# Patient Record
Sex: FEMALE | Race: BLACK OR AFRICAN AMERICAN | HISPANIC OR LATINO | URBAN - METROPOLITAN AREA
[De-identification: names, ages, dates, MRNs, and addresses within clinical notes are randomized per-mention and may not be internally consistent; named-entity substitution may affect disease eponyms.]

---

## 2017-10-25 ENCOUNTER — OUTPATIENT (OUTPATIENT)
Dept: OUTPATIENT SERVICES | Facility: HOSPITAL | Age: 72
LOS: 1 days | End: 2017-10-25
Payer: MEDICARE

## 2017-10-25 VITALS
RESPIRATION RATE: 16 BRPM | WEIGHT: 177.91 LBS | OXYGEN SATURATION: 97 % | DIASTOLIC BLOOD PRESSURE: 90 MMHG | SYSTOLIC BLOOD PRESSURE: 160 MMHG | HEART RATE: 59 BPM | HEIGHT: 67 IN | TEMPERATURE: 98 F

## 2017-10-25 DIAGNOSIS — Z90.49 ACQUIRED ABSENCE OF OTHER SPECIFIED PARTS OF DIGESTIVE TRACT: Chronic | ICD-10-CM

## 2017-10-25 DIAGNOSIS — M17.12 UNILATERAL PRIMARY OSTEOARTHRITIS, LEFT KNEE: ICD-10-CM

## 2017-10-25 DIAGNOSIS — Z01.818 ENCOUNTER FOR OTHER PREPROCEDURAL EXAMINATION: ICD-10-CM

## 2017-10-25 LAB
ALBUMIN SERPL ELPH-MCNC: 3.7 G/DL — SIGNIFICANT CHANGE UP (ref 3.5–5)
ALP SERPL-CCNC: 93 U/L — SIGNIFICANT CHANGE UP (ref 40–120)
ALT FLD-CCNC: 18 U/L DA — SIGNIFICANT CHANGE UP (ref 10–60)
ANION GAP SERPL CALC-SCNC: 3 MMOL/L — LOW (ref 5–17)
APTT BLD: 30.9 SEC — SIGNIFICANT CHANGE UP (ref 27.5–37.4)
AST SERPL-CCNC: 17 U/L — SIGNIFICANT CHANGE UP (ref 10–40)
BILIRUB SERPL-MCNC: 0.8 MG/DL — SIGNIFICANT CHANGE UP (ref 0.2–1.2)
BUN SERPL-MCNC: 12 MG/DL — SIGNIFICANT CHANGE UP (ref 7–18)
CALCIUM SERPL-MCNC: 9.2 MG/DL — SIGNIFICANT CHANGE UP (ref 8.4–10.5)
CHLORIDE SERPL-SCNC: 106 MMOL/L — SIGNIFICANT CHANGE UP (ref 96–108)
CO2 SERPL-SCNC: 32 MMOL/L — HIGH (ref 22–31)
CREAT SERPL-MCNC: 0.65 MG/DL — SIGNIFICANT CHANGE UP (ref 0.5–1.3)
GLUCOSE SERPL-MCNC: 103 MG/DL — HIGH (ref 70–99)
HCT VFR BLD CALC: 41 % — SIGNIFICANT CHANGE UP (ref 34.5–45)
HGB BLD-MCNC: 13.8 G/DL — SIGNIFICANT CHANGE UP (ref 11.5–15.5)
INR BLD: 0.97 RATIO — SIGNIFICANT CHANGE UP (ref 0.88–1.16)
MCHC RBC-ENTMCNC: 30.6 PG — SIGNIFICANT CHANGE UP (ref 27–34)
MCHC RBC-ENTMCNC: 33.6 GM/DL — SIGNIFICANT CHANGE UP (ref 32–36)
MCV RBC AUTO: 91.1 FL — SIGNIFICANT CHANGE UP (ref 80–100)
PLATELET # BLD AUTO: 266 K/UL — SIGNIFICANT CHANGE UP (ref 150–400)
POTASSIUM SERPL-MCNC: 4 MMOL/L — SIGNIFICANT CHANGE UP (ref 3.5–5.3)
POTASSIUM SERPL-SCNC: 4 MMOL/L — SIGNIFICANT CHANGE UP (ref 3.5–5.3)
PROT SERPL-MCNC: 7.5 G/DL — SIGNIFICANT CHANGE UP (ref 6–8.3)
PROTHROM AB SERPL-ACNC: 10.6 SEC — SIGNIFICANT CHANGE UP (ref 9.8–12.7)
RBC # BLD: 4.5 M/UL — SIGNIFICANT CHANGE UP (ref 3.8–5.2)
RBC # FLD: 11.9 % — SIGNIFICANT CHANGE UP (ref 10.3–14.5)
SODIUM SERPL-SCNC: 141 MMOL/L — SIGNIFICANT CHANGE UP (ref 135–145)
WBC # BLD: 4 K/UL — SIGNIFICANT CHANGE UP (ref 3.8–10.5)
WBC # FLD AUTO: 4 K/UL — SIGNIFICANT CHANGE UP (ref 3.8–10.5)

## 2017-10-25 PROCEDURE — 87640 STAPH A DNA AMP PROBE: CPT

## 2017-10-25 PROCEDURE — 87641 MR-STAPH DNA AMP PROBE: CPT

## 2017-10-25 PROCEDURE — 85027 COMPLETE CBC AUTOMATED: CPT

## 2017-10-25 PROCEDURE — G0463: CPT

## 2017-10-25 PROCEDURE — 85730 THROMBOPLASTIN TIME PARTIAL: CPT

## 2017-10-25 PROCEDURE — 85610 PROTHROMBIN TIME: CPT

## 2017-10-25 PROCEDURE — 86870 RBC ANTIBODY IDENTIFICATION: CPT

## 2017-10-25 PROCEDURE — 86900 BLOOD TYPING SEROLOGIC ABO: CPT

## 2017-10-25 PROCEDURE — 86905 BLOOD TYPING RBC ANTIGENS: CPT

## 2017-10-25 PROCEDURE — 80053 COMPREHEN METABOLIC PANEL: CPT

## 2017-10-25 PROCEDURE — 86901 BLOOD TYPING SEROLOGIC RH(D): CPT

## 2017-10-25 PROCEDURE — 86850 RBC ANTIBODY SCREEN: CPT

## 2017-10-25 NOTE — H&P PST ADULT - PROBLEM SELECTOR PLAN 1
Scheduled for left total knee replacement on 11/7/2017. Preoperative instructions discussed and given to patient. Verbalized understanding of same

## 2017-10-25 NOTE — H&P PST ADULT - GASTROINTESTINAL DETAILS
normal/soft/no rebound tenderness/nontender/no guarding/no masses palpable/bowel sounds normal/no rigidity/no bruit/no distention/no organomegaly

## 2017-10-25 NOTE — H&P PST ADULT - NEGATIVE GENERAL SYMPTOMS
no anorexia/no sweating/no polyuria/no malaise/no fatigue/no chills/no polydipsia/no weight loss/no polyphagia/no fever

## 2017-10-25 NOTE — H&P PST ADULT - NEGATIVE CARDIOVASCULAR SYMPTOMS
no orthopnea/no palpitations/no claudication/no peripheral edema/no paroxysmal nocturnal dyspnea/no chest pain/no dyspnea on exertion

## 2017-10-25 NOTE — H&P PST ADULT - FAMILY HISTORY
Father  Still living? Unknown  Family history of essential hypertension, Age at diagnosis: Age Unknown     Mother  Still living? No  Family history of essential hypertension, Age at diagnosis: Age Unknown

## 2017-10-25 NOTE — H&P PST ADULT - HISTORY OF PRESENT ILLNESS
73 y/o female with PMH of hypertension, hyperlipidemia, osteoarthritis of multiple joints presents to [facility] for presurgical evaluation. Complains of worsening left knee pain for over     . Failed conservative treatment with injections and is scheduled for left total knee replacement on 11/7/2017 71 y/o female with PMH of hypertension, hyperlipidemia, and osteoarthritis presents to [facility] for presurgical evaluation. Complains of worsening left knee pain for over the past 2 years. Failed conservative treatment with physical therapy and is now scheduled for left total knee replacement on 11/7/2017

## 2017-10-25 NOTE — H&P PST ADULT - ASSESSMENT
73 y/o female with PMH of hypertension, hyperlipidemia, and primary osteoarthritis of left knee scheduled for left total knee replacement on 11/7/2017. Patient is at low to moderate risk for planned surgery

## 2017-10-25 NOTE — H&P PST ADULT - RS GEN PE MLT RESP DETAILS PC
no chest wall tenderness/no wheezes/airway patent/good air movement/breath sounds equal/no intercostal retractions/no rales/respirations non-labored/no rhonchi/no subcutaneous emphysema/normal/clear to auscultation bilaterally

## 2017-10-25 NOTE — H&P PST ADULT - PMH
Primary osteoarthritis of left knee Essential (primary) hypertension    Hyperlipidemia, unspecified hyperlipidemia type    Primary osteoarthritis of left knee    Primary osteoarthritis of left knee

## 2017-10-26 LAB
MRSA PCR RESULT.: SIGNIFICANT CHANGE UP
S AUREUS DNA NOSE QL NAA+PROBE: SIGNIFICANT CHANGE UP

## 2017-11-06 ENCOUNTER — TRANSCRIPTION ENCOUNTER (OUTPATIENT)
Age: 72
End: 2017-11-06

## 2017-11-06 RX ORDER — ACETAMINOPHEN 500 MG
975 TABLET ORAL ONCE
Qty: 0 | Refills: 0 | Status: COMPLETED | OUTPATIENT
Start: 2017-11-07 | End: 2017-11-07

## 2017-11-07 ENCOUNTER — INPATIENT (INPATIENT)
Facility: HOSPITAL | Age: 72
LOS: 2 days | Discharge: NURSING, REHAB OR LONG-TERM CARE | DRG: 470 | End: 2017-11-10
Attending: ORTHOPAEDIC SURGERY | Admitting: ORTHOPAEDIC SURGERY
Payer: COMMERCIAL

## 2017-11-07 VITALS
WEIGHT: 177.91 LBS | HEIGHT: 67 IN | OXYGEN SATURATION: 97 % | RESPIRATION RATE: 14 BRPM | SYSTOLIC BLOOD PRESSURE: 146 MMHG | TEMPERATURE: 98 F | HEART RATE: 87 BPM | DIASTOLIC BLOOD PRESSURE: 85 MMHG

## 2017-11-07 DIAGNOSIS — Z90.49 ACQUIRED ABSENCE OF OTHER SPECIFIED PARTS OF DIGESTIVE TRACT: Chronic | ICD-10-CM

## 2017-11-07 DIAGNOSIS — M17.12 UNILATERAL PRIMARY OSTEOARTHRITIS, LEFT KNEE: ICD-10-CM

## 2017-11-07 LAB
ALLERGY+IMMUNOLOGY DIAG STUDY NOTE: SIGNIFICANT CHANGE UP
ANION GAP SERPL CALC-SCNC: 5 MMOL/L — SIGNIFICANT CHANGE UP (ref 5–17)
BLD GP AB SCN SERPL QL: ABNORMAL
BUN SERPL-MCNC: 12 MG/DL — SIGNIFICANT CHANGE UP (ref 7–18)
CALCIUM SERPL-MCNC: 8.6 MG/DL — SIGNIFICANT CHANGE UP (ref 8.4–10.5)
CHLORIDE SERPL-SCNC: 106 MMOL/L — SIGNIFICANT CHANGE UP (ref 96–108)
CO2 SERPL-SCNC: 30 MMOL/L — SIGNIFICANT CHANGE UP (ref 22–31)
CREAT SERPL-MCNC: 0.56 MG/DL — SIGNIFICANT CHANGE UP (ref 0.5–1.3)
GLUCOSE SERPL-MCNC: 100 MG/DL — HIGH (ref 70–99)
HCT VFR BLD CALC: 34.6 % — SIGNIFICANT CHANGE UP (ref 34.5–45)
HGB BLD-MCNC: 11.6 G/DL — SIGNIFICANT CHANGE UP (ref 11.5–15.5)
MCHC RBC-ENTMCNC: 30.4 PG — SIGNIFICANT CHANGE UP (ref 27–34)
MCHC RBC-ENTMCNC: 33.5 GM/DL — SIGNIFICANT CHANGE UP (ref 32–36)
MCV RBC AUTO: 90.7 FL — SIGNIFICANT CHANGE UP (ref 80–100)
PLATELET # BLD AUTO: 192 K/UL — SIGNIFICANT CHANGE UP (ref 150–400)
POTASSIUM SERPL-MCNC: 4.2 MMOL/L — SIGNIFICANT CHANGE UP (ref 3.5–5.3)
POTASSIUM SERPL-SCNC: 4.2 MMOL/L — SIGNIFICANT CHANGE UP (ref 3.5–5.3)
RBC # BLD: 3.81 M/UL — SIGNIFICANT CHANGE UP (ref 3.8–5.2)
RBC # FLD: 11.8 % — SIGNIFICANT CHANGE UP (ref 10.3–14.5)
SODIUM SERPL-SCNC: 141 MMOL/L — SIGNIFICANT CHANGE UP (ref 135–145)
WBC # BLD: 3.8 K/UL — SIGNIFICANT CHANGE UP (ref 3.8–10.5)
WBC # FLD AUTO: 3.8 K/UL — SIGNIFICANT CHANGE UP (ref 3.8–10.5)

## 2017-11-07 PROCEDURE — 73562 X-RAY EXAM OF KNEE 3: CPT | Mod: 26,LT

## 2017-11-07 PROCEDURE — 27350 REMOVAL OF KNEECAP: CPT | Mod: AS,59,LT

## 2017-11-07 PROCEDURE — 73560 X-RAY EXAM OF KNEE 1 OR 2: CPT | Mod: 26

## 2017-11-07 PROCEDURE — 27637 REMOVE/GRAFT LEG BONE LESION: CPT | Mod: AS,59,LT

## 2017-11-07 PROCEDURE — 27447 TOTAL KNEE ARTHROPLASTY: CPT | Mod: AS,LT

## 2017-11-07 PROCEDURE — 88311 DECALCIFY TISSUE: CPT | Mod: 26

## 2017-11-07 PROCEDURE — 88305 TISSUE EXAM BY PATHOLOGIST: CPT | Mod: 26

## 2017-11-07 RX ORDER — ASCORBIC ACID 60 MG
500 TABLET,CHEWABLE ORAL
Qty: 0 | Refills: 0 | Status: DISCONTINUED | OUTPATIENT
Start: 2017-11-07 | End: 2017-11-10

## 2017-11-07 RX ORDER — ONDANSETRON 8 MG/1
4 TABLET, FILM COATED ORAL EVERY 6 HOURS
Qty: 0 | Refills: 0 | Status: DISCONTINUED | OUTPATIENT
Start: 2017-11-07 | End: 2017-11-07

## 2017-11-07 RX ORDER — BUPIVACAINE 13.3 MG/ML
20 INJECTION, SUSPENSION, LIPOSOMAL INFILTRATION ONCE
Qty: 0 | Refills: 0 | Status: DISCONTINUED | OUTPATIENT
Start: 2017-11-07 | End: 2017-11-07

## 2017-11-07 RX ORDER — SODIUM CHLORIDE 9 MG/ML
3 INJECTION INTRAMUSCULAR; INTRAVENOUS; SUBCUTANEOUS EVERY 8 HOURS
Qty: 0 | Refills: 0 | Status: DISCONTINUED | OUTPATIENT
Start: 2017-11-07 | End: 2017-11-07

## 2017-11-07 RX ORDER — ASPIRIN/CALCIUM CARB/MAGNESIUM 324 MG
81 TABLET ORAL DAILY
Qty: 0 | Refills: 0 | Status: DISCONTINUED | OUTPATIENT
Start: 2017-11-07 | End: 2017-11-10

## 2017-11-07 RX ORDER — KETOROLAC TROMETHAMINE 30 MG/ML
15 SYRINGE (ML) INJECTION EVERY 6 HOURS
Qty: 0 | Refills: 0 | Status: DISCONTINUED | OUTPATIENT
Start: 2017-11-07 | End: 2017-11-10

## 2017-11-07 RX ORDER — ASPIRIN/CALCIUM CARB/MAGNESIUM 324 MG
1 TABLET ORAL
Qty: 0 | Refills: 0 | COMMUNITY

## 2017-11-07 RX ORDER — SODIUM CHLORIDE 9 MG/ML
1000 INJECTION, SOLUTION INTRAVENOUS
Qty: 0 | Refills: 0 | Status: DISCONTINUED | OUTPATIENT
Start: 2017-11-07 | End: 2017-11-10

## 2017-11-07 RX ORDER — HYDROMORPHONE HYDROCHLORIDE 2 MG/ML
0.5 INJECTION INTRAMUSCULAR; INTRAVENOUS; SUBCUTANEOUS
Qty: 0 | Refills: 0 | Status: DISCONTINUED | OUTPATIENT
Start: 2017-11-07 | End: 2017-11-07

## 2017-11-07 RX ORDER — SODIUM CHLORIDE 9 MG/ML
1000 INJECTION, SOLUTION INTRAVENOUS
Qty: 0 | Refills: 0 | Status: DISCONTINUED | OUTPATIENT
Start: 2017-11-07 | End: 2017-11-07

## 2017-11-07 RX ORDER — CELECOXIB 200 MG/1
100 CAPSULE ORAL
Qty: 0 | Refills: 0 | Status: DISCONTINUED | OUTPATIENT
Start: 2017-11-08 | End: 2017-11-10

## 2017-11-07 RX ORDER — ONDANSETRON 8 MG/1
4 TABLET, FILM COATED ORAL EVERY 6 HOURS
Qty: 0 | Refills: 0 | Status: DISCONTINUED | OUTPATIENT
Start: 2017-11-07 | End: 2017-11-09

## 2017-11-07 RX ORDER — PROPRANOLOL HCL 160 MG
40 CAPSULE, EXTENDED RELEASE 24HR ORAL DAILY
Qty: 0 | Refills: 0 | Status: DISCONTINUED | OUTPATIENT
Start: 2017-11-07 | End: 2017-11-10

## 2017-11-07 RX ORDER — ATORVASTATIN CALCIUM 80 MG/1
10 TABLET, FILM COATED ORAL AT BEDTIME
Qty: 0 | Refills: 0 | Status: DISCONTINUED | OUTPATIENT
Start: 2017-11-07 | End: 2017-11-10

## 2017-11-07 RX ORDER — GABAPENTIN 400 MG/1
100 CAPSULE ORAL DAILY
Qty: 0 | Refills: 0 | Status: DISCONTINUED | OUTPATIENT
Start: 2017-11-07 | End: 2017-11-10

## 2017-11-07 RX ORDER — ACETAMINOPHEN 500 MG
1000 TABLET ORAL EVERY 8 HOURS
Qty: 0 | Refills: 0 | Status: COMPLETED | OUTPATIENT
Start: 2017-11-07 | End: 2017-11-08

## 2017-11-07 RX ORDER — LOVASTATIN 20 MG
1 TABLET ORAL
Qty: 0 | Refills: 0 | COMMUNITY

## 2017-11-07 RX ORDER — NALOXONE HYDROCHLORIDE 4 MG/.1ML
0.1 SPRAY NASAL
Qty: 0 | Refills: 0 | Status: DISCONTINUED | OUTPATIENT
Start: 2017-11-07 | End: 2017-11-09

## 2017-11-07 RX ORDER — ACETAMINOPHEN 500 MG
650 TABLET ORAL EVERY 6 HOURS
Qty: 0 | Refills: 0 | Status: DISCONTINUED | OUTPATIENT
Start: 2017-11-07 | End: 2017-11-10

## 2017-11-07 RX ORDER — FERROUS SULFATE 325(65) MG
325 TABLET ORAL
Qty: 0 | Refills: 0 | Status: DISCONTINUED | OUTPATIENT
Start: 2017-11-07 | End: 2017-11-10

## 2017-11-07 RX ORDER — HYDROCHLOROTHIAZIDE 25 MG
25 TABLET ORAL DAILY
Qty: 0 | Refills: 0 | Status: DISCONTINUED | OUTPATIENT
Start: 2017-11-07 | End: 2017-11-10

## 2017-11-07 RX ORDER — FOLIC ACID 0.8 MG
1 TABLET ORAL DAILY
Qty: 0 | Refills: 0 | Status: DISCONTINUED | OUTPATIENT
Start: 2017-11-07 | End: 2017-11-10

## 2017-11-07 RX ORDER — DOCUSATE SODIUM 100 MG
100 CAPSULE ORAL THREE TIMES A DAY
Qty: 0 | Refills: 0 | Status: DISCONTINUED | OUTPATIENT
Start: 2017-11-07 | End: 2017-11-10

## 2017-11-07 RX ORDER — SENNA PLUS 8.6 MG/1
2 TABLET ORAL AT BEDTIME
Qty: 0 | Refills: 0 | Status: DISCONTINUED | OUTPATIENT
Start: 2017-11-07 | End: 2017-11-10

## 2017-11-07 RX ORDER — CELECOXIB 200 MG/1
200 CAPSULE ORAL ONCE
Qty: 0 | Refills: 0 | Status: COMPLETED | OUTPATIENT
Start: 2017-11-07 | End: 2017-11-07

## 2017-11-07 RX ORDER — ENOXAPARIN SODIUM 100 MG/ML
30 INJECTION SUBCUTANEOUS EVERY 12 HOURS
Qty: 0 | Refills: 0 | Status: DISCONTINUED | OUTPATIENT
Start: 2017-11-08 | End: 2017-11-10

## 2017-11-07 RX ORDER — HYDROMORPHONE HYDROCHLORIDE 2 MG/ML
30 INJECTION INTRAMUSCULAR; INTRAVENOUS; SUBCUTANEOUS
Qty: 0 | Refills: 0 | Status: DISCONTINUED | OUTPATIENT
Start: 2017-11-07 | End: 2017-11-09

## 2017-11-07 RX ORDER — PROPRANOLOL HCL 160 MG
1 CAPSULE, EXTENDED RELEASE 24HR ORAL
Qty: 0 | Refills: 0 | COMMUNITY

## 2017-11-07 RX ORDER — ACETAMINOPHEN 500 MG
1000 TABLET ORAL ONCE
Qty: 0 | Refills: 0 | Status: DISCONTINUED | OUTPATIENT
Start: 2017-11-07 | End: 2017-11-07

## 2017-11-07 RX ORDER — ACETAMINOPHEN 500 MG
1000 TABLET ORAL EVERY 8 HOURS
Qty: 0 | Refills: 0 | Status: DISCONTINUED | OUTPATIENT
Start: 2017-11-07 | End: 2017-11-07

## 2017-11-07 RX ADMIN — HYDROMORPHONE HYDROCHLORIDE 30 MILLILITER(S): 2 INJECTION INTRAMUSCULAR; INTRAVENOUS; SUBCUTANEOUS at 15:45

## 2017-11-07 RX ADMIN — SODIUM CHLORIDE 130 MILLILITER(S): 9 INJECTION, SOLUTION INTRAVENOUS at 15:00

## 2017-11-07 RX ADMIN — CELECOXIB 200 MILLIGRAM(S): 200 CAPSULE ORAL at 09:08

## 2017-11-07 RX ADMIN — SODIUM CHLORIDE 80 MILLILITER(S): 9 INJECTION, SOLUTION INTRAVENOUS at 18:49

## 2017-11-07 RX ADMIN — Medication 975 MILLIGRAM(S): at 09:08

## 2017-11-07 RX ADMIN — HYDROMORPHONE HYDROCHLORIDE 30 MILLILITER(S): 2 INJECTION INTRAMUSCULAR; INTRAVENOUS; SUBCUTANEOUS at 19:43

## 2017-11-07 RX ADMIN — HYDROMORPHONE HYDROCHLORIDE 30 MILLILITER(S): 2 INJECTION INTRAMUSCULAR; INTRAVENOUS; SUBCUTANEOUS at 17:25

## 2017-11-07 RX ADMIN — Medication 500 MILLIGRAM(S): at 18:48

## 2017-11-07 RX ADMIN — Medication 100 MILLIGRAM(S): at 21:36

## 2017-11-07 RX ADMIN — ATORVASTATIN CALCIUM 10 MILLIGRAM(S): 80 TABLET, FILM COATED ORAL at 21:36

## 2017-11-07 RX ADMIN — SODIUM CHLORIDE 3 MILLILITER(S): 9 INJECTION INTRAMUSCULAR; INTRAVENOUS; SUBCUTANEOUS at 09:10

## 2017-11-07 RX ADMIN — Medication 100 MILLIGRAM(S): at 21:42

## 2017-11-07 NOTE — PHYSICAL THERAPY INITIAL EVALUATION ADULT - IMPAIRMENTS FOUND, PT EVAL
gross motor/joint integrity and mobility/gait, locomotion, and balance/muscle strength/aerobic capacity/endurance/ROM

## 2017-11-07 NOTE — PHYSICAL THERAPY INITIAL EVALUATION ADULT - LEVEL OF INDEPENDENCE: STAIR NEGOTIATION, REHAB EVAL
Unable to assess pt on the stairs at this time, pt does not exhibit appropriate pre requisite skills to safely negotiate unlevel surfaces due to limited post op knee rom, decrease musculature and post op day 0 tkr which placed pt significant risks for falls and injury

## 2017-11-07 NOTE — PHYSICAL THERAPY INITIAL EVALUATION ADULT - ACTIVE RANGE OF MOTION EXAMINATION, REHAB EVAL
Left LE Active ROM was WFL (within functional limits)/L knee flexion 0-45; extension 0-180 degrees/Right LE Active ROM was WFL (within functional limits)/deficits as listed below

## 2017-11-07 NOTE — PATIENT PROFILE ADULT. - PRO PAIN LIFE ADAPT
inability to enjoy life/inability to sleep/decreased activity level/inability or reluctance to perform ADLs

## 2017-11-07 NOTE — PHYSICAL THERAPY INITIAL EVALUATION ADULT - GENERAL OBSERVATIONS, REHAB EVAL
Pt aox3 highly motivated PT visit, daughter present, s/pL TKR pca pain management, rom 0-45 full extension,  bed mobility assessment  sitting balance activities with assistance

## 2017-11-07 NOTE — PATIENT PROFILE ADULT. - PMH
Essential (primary) hypertension    Hyperlipidemia, unspecified hyperlipidemia type    Primary osteoarthritis of left knee    Primary osteoarthritis of left knee

## 2017-11-08 DIAGNOSIS — Z96.652 PRESENCE OF LEFT ARTIFICIAL KNEE JOINT: ICD-10-CM

## 2017-11-08 DIAGNOSIS — I10 ESSENTIAL (PRIMARY) HYPERTENSION: ICD-10-CM

## 2017-11-08 DIAGNOSIS — G89.18 OTHER ACUTE POSTPROCEDURAL PAIN: ICD-10-CM

## 2017-11-08 DIAGNOSIS — E78.5 HYPERLIPIDEMIA, UNSPECIFIED: ICD-10-CM

## 2017-11-08 RX ADMIN — GABAPENTIN 100 MILLIGRAM(S): 400 CAPSULE ORAL at 13:20

## 2017-11-08 RX ADMIN — Medication 100 MILLIGRAM(S): at 13:21

## 2017-11-08 RX ADMIN — Medication 325 MILLIGRAM(S): at 08:01

## 2017-11-08 RX ADMIN — Medication 1 TABLET(S): at 13:20

## 2017-11-08 RX ADMIN — Medication 400 MILLIGRAM(S): at 06:11

## 2017-11-08 RX ADMIN — Medication 500 MILLIGRAM(S): at 06:07

## 2017-11-08 RX ADMIN — Medication 81 MILLIGRAM(S): at 13:20

## 2017-11-08 RX ADMIN — HYDROMORPHONE HYDROCHLORIDE 30 MILLILITER(S): 2 INJECTION INTRAMUSCULAR; INTRAVENOUS; SUBCUTANEOUS at 19:14

## 2017-11-08 RX ADMIN — Medication 325 MILLIGRAM(S): at 13:20

## 2017-11-08 RX ADMIN — Medication 100 MILLIGRAM(S): at 06:07

## 2017-11-08 RX ADMIN — Medication 100 MILLIGRAM(S): at 06:11

## 2017-11-08 RX ADMIN — Medication 1 MILLIGRAM(S): at 13:20

## 2017-11-08 RX ADMIN — Medication 325 MILLIGRAM(S): at 17:14

## 2017-11-08 RX ADMIN — Medication 1000 MILLIGRAM(S): at 06:40

## 2017-11-08 RX ADMIN — Medication 500 MILLIGRAM(S): at 17:14

## 2017-11-08 RX ADMIN — CELECOXIB 100 MILLIGRAM(S): 200 CAPSULE ORAL at 13:57

## 2017-11-08 RX ADMIN — Medication 25 MILLIGRAM(S): at 06:07

## 2017-11-08 RX ADMIN — Medication 40 MILLIGRAM(S): at 06:07

## 2017-11-08 RX ADMIN — HYDROMORPHONE HYDROCHLORIDE 30 MILLILITER(S): 2 INJECTION INTRAMUSCULAR; INTRAVENOUS; SUBCUTANEOUS at 07:25

## 2017-11-08 RX ADMIN — ENOXAPARIN SODIUM 30 MILLIGRAM(S): 100 INJECTION SUBCUTANEOUS at 00:43

## 2017-11-08 RX ADMIN — CELECOXIB 100 MILLIGRAM(S): 200 CAPSULE ORAL at 13:19

## 2017-11-08 RX ADMIN — ENOXAPARIN SODIUM 30 MILLIGRAM(S): 100 INJECTION SUBCUTANEOUS at 13:20

## 2017-11-08 NOTE — PROGRESS NOTE ADULT - SUBJECTIVE AND OBJECTIVE BOX
72yFemale    Diagnosis:  S/p L Total Knee Replacement POD# 1    Patient was seen and evaluated at bedside. Patient with no acute complaints.   Pain is  well controlled. Jamey was james'earline this AM.   Pain is controlled via PCA.  Denies CP/SOB, dyspnea, paresthesias, N/V/D, palpitations.     Vital Signs Last 24 Hrs  T(C): 36.4 (08 Nov 2017 05:58), Max: 37 (07 Nov 2017 17:40)  T(F): 97.5 (08 Nov 2017 05:58), Max: 98.6 (07 Nov 2017 17:40)  HR: 61 (08 Nov 2017 12:19) (50 - 84)  BP: 128/60 (08 Nov 2017 12:19) (119/64 - 164/78)  BP(mean): 98 (07 Nov 2017 17:15) (96 - 115)  RR: 16 (08 Nov 2017 05:58) (13 - 21)  SpO2: 100% (08 Nov 2017 12:19) (95% - 100%)  I&O's Detail    07 Nov 2017 07:01  -  08 Nov 2017 07:00  --------------------------------------------------------  IN:    Lactated Ringers IV Bolus: 2000 mL    lactated ringers.: 390 mL  Total IN: 2390 mL    OUT:    Accordian: 455 mL    Indwelling Catheter - Urethral: 2325 mL  Total OUT: 2780 mL    Total NET: -390 mL    Physical Exam:    General: AAOx3, NAD, resting comfortably in bed.    L KNEE:  Dressing is C/D/I. In normal alignment. No erythema, HV intact.   Lower extremity:  No calf tenderness, calves are soft. 2+pulses. NVI. (+) EHL/FHL/ADF/APF.  Good capillary refill.                           11.6   3.8   )-----------( 192      ( 07 Nov 2017 15:10 )             34.6     11-07    141  |  106  |  12  ----------------------------<  100<H>  4.2   |  30  |  0.56    Ca    8.6      07 Nov 2017 15:10      Impression:  72yFemale S/p L Total Knee Replacement POD# 1  Plan:  -  Pain management  -  DVT prophylaxis  -  Daily Physical Therapy:  WBAT  to LLE  -  Discharge planning: Home Vs. Rehab pending Physical therapy eval.  -  Heel bump to LLE  -  Incentive Spirometer  -  Continue with Post-op Antibiotics x 24hrs  -  Discontinue Concepcion catheter today  -  Case d/w Dr. Carrillo

## 2017-11-08 NOTE — PROGRESS NOTE ADULT - SUBJECTIVE AND OBJECTIVE BOX
Chief Complaint: left knee pain    HPI:   72y Female s/p left knee replacement, pod#1.  Pt complaining of left knee pain which worsens on exertion.  No nausea or vomiting.  No chest pain or sob.        PAIN SCORE:     5/10    SCALE USED: (1-10 VNRS)    Allergies    penicillins (Rash)    Intolerances      MEDICATIONS  (STANDING):  ascorbic acid 500 milliGRAM(s) Oral two times a day  aspirin  chewable 81 milliGRAM(s) Oral daily  atorvastatin 10 milliGRAM(s) Oral at bedtime  docusate sodium 100 milliGRAM(s) Oral three times a day  ferrous    sulfate 325 milliGRAM(s) Oral three times a day with meals  folic acid 1 milliGRAM(s) Oral daily  gabapentin 100 milliGRAM(s) Oral daily  hydrochlorothiazide 25 milliGRAM(s) Oral daily  HYDROmorphone PCA (1 mG/mL) 30 milliLiter(s) PCA Continuous PCA Continuous  multivitamin 1 Tablet(s) Oral daily  propranolol 40 milliGRAM(s) Oral daily  sodium chloride 0.45%. 1000 milliLiter(s) (80 mL/Hr) IV Continuous <Continuous>    MEDICATIONS  (PRN):  acetaminophen   Tablet 650 milliGRAM(s) Oral every 6 hours PRN For Temp over 38.3 C (100.94 F)  aluminum hydroxide/magnesium hydroxide/simethicone Suspension 30 milliLiter(s) Oral four times a day PRN Indigestion  ketorolac   Injectable 15 milliGRAM(s) IV Push every 6 hours PRN Moderate Pain (4 - 6)  naloxone Injectable 0.1 milliGRAM(s) IV Push every 3 minutes PRN For ANY of the following changes in patient status:  A. RR LESS THAN 10 breaths per minute, B. Oxygen saturation LESS THAN 90%, C. Sedation score of 6  ondansetron Injectable 4 milliGRAM(s) IV Push every 6 hours PRN Nausea  senna 2 Tablet(s) Oral at bedtime PRN Constipation      PHYSICAL EXAM:    Vital Signs Last 24 Hrs  T(C): 36.4 (08 Nov 2017 05:58), Max: 37 (07 Nov 2017 17:40)  T(F): 97.5 (08 Nov 2017 05:58), Max: 98.6 (07 Nov 2017 17:40)  HR: 61 (08 Nov 2017 12:19) (50 - 84)  BP: 128/60 (08 Nov 2017 12:19) (119/64 - 164/78)  BP(mean): 98 (07 Nov 2017 17:15) (96 - 115)  RR: 16 (08 Nov 2017 05:58) (13 - 21)  SpO2: 100% (08 Nov 2017 12:19) (95% - 100%)             LABS:                          11.6   3.8   )-----------( 192      ( 07 Nov 2017 15:10 )             34.6     11-07    141  |  106  |  12  ----------------------------<  100<H>  4.2   |  30  |  0.56    Ca    8.6      07 Nov 2017 15:10            Drug Screen:        RADIOLOGY:

## 2017-11-09 LAB
ANION GAP SERPL CALC-SCNC: 6 MMOL/L — SIGNIFICANT CHANGE UP (ref 5–17)
BUN SERPL-MCNC: 12 MG/DL — SIGNIFICANT CHANGE UP (ref 7–18)
CALCIUM SERPL-MCNC: 8.4 MG/DL — SIGNIFICANT CHANGE UP (ref 8.4–10.5)
CHLORIDE SERPL-SCNC: 103 MMOL/L — SIGNIFICANT CHANGE UP (ref 96–108)
CO2 SERPL-SCNC: 29 MMOL/L — SIGNIFICANT CHANGE UP (ref 22–31)
CREAT SERPL-MCNC: 0.67 MG/DL — SIGNIFICANT CHANGE UP (ref 0.5–1.3)
GLUCOSE SERPL-MCNC: 110 MG/DL — HIGH (ref 70–99)
HCT VFR BLD CALC: 28.2 % — LOW (ref 34.5–45)
HGB BLD-MCNC: 9.6 G/DL — LOW (ref 11.5–15.5)
MCHC RBC-ENTMCNC: 31.2 PG — SIGNIFICANT CHANGE UP (ref 27–34)
MCHC RBC-ENTMCNC: 33.9 GM/DL — SIGNIFICANT CHANGE UP (ref 32–36)
MCV RBC AUTO: 91.9 FL — SIGNIFICANT CHANGE UP (ref 80–100)
PLATELET # BLD AUTO: 175 K/UL — SIGNIFICANT CHANGE UP (ref 150–400)
POTASSIUM SERPL-MCNC: 3.8 MMOL/L — SIGNIFICANT CHANGE UP (ref 3.5–5.3)
POTASSIUM SERPL-SCNC: 3.8 MMOL/L — SIGNIFICANT CHANGE UP (ref 3.5–5.3)
RBC # BLD: 3.07 M/UL — LOW (ref 3.8–5.2)
RBC # FLD: 12.1 % — SIGNIFICANT CHANGE UP (ref 10.3–14.5)
SODIUM SERPL-SCNC: 138 MMOL/L — SIGNIFICANT CHANGE UP (ref 135–145)
SURGICAL PATHOLOGY FINAL REPORT - CH: SIGNIFICANT CHANGE UP
WBC # BLD: 7.3 K/UL — SIGNIFICANT CHANGE UP (ref 3.8–10.5)
WBC # FLD AUTO: 7.3 K/UL — SIGNIFICANT CHANGE UP (ref 3.8–10.5)

## 2017-11-09 RX ORDER — OXYCODONE HYDROCHLORIDE 5 MG/1
5 TABLET ORAL EVERY 6 HOURS
Qty: 0 | Refills: 0 | Status: DISCONTINUED | OUTPATIENT
Start: 2017-11-09 | End: 2017-11-10

## 2017-11-09 RX ORDER — TRAMADOL HYDROCHLORIDE 50 MG/1
50 TABLET ORAL EVERY 8 HOURS
Qty: 0 | Refills: 0 | Status: DISCONTINUED | OUTPATIENT
Start: 2017-11-09 | End: 2017-11-10

## 2017-11-09 RX ADMIN — HYDROMORPHONE HYDROCHLORIDE 30 MILLILITER(S): 2 INJECTION INTRAMUSCULAR; INTRAVENOUS; SUBCUTANEOUS at 07:42

## 2017-11-09 RX ADMIN — ATORVASTATIN CALCIUM 10 MILLIGRAM(S): 80 TABLET, FILM COATED ORAL at 21:46

## 2017-11-09 RX ADMIN — Medication 100 MILLIGRAM(S): at 06:46

## 2017-11-09 RX ADMIN — Medication 100 MILLIGRAM(S): at 12:12

## 2017-11-09 RX ADMIN — OXYCODONE HYDROCHLORIDE 5 MILLIGRAM(S): 5 TABLET ORAL at 14:35

## 2017-11-09 RX ADMIN — ENOXAPARIN SODIUM 30 MILLIGRAM(S): 100 INJECTION SUBCUTANEOUS at 00:27

## 2017-11-09 RX ADMIN — ENOXAPARIN SODIUM 30 MILLIGRAM(S): 100 INJECTION SUBCUTANEOUS at 12:12

## 2017-11-09 RX ADMIN — Medication 325 MILLIGRAM(S): at 17:57

## 2017-11-09 RX ADMIN — Medication 100 MILLIGRAM(S): at 21:45

## 2017-11-09 RX ADMIN — Medication 500 MILLIGRAM(S): at 06:46

## 2017-11-09 RX ADMIN — CELECOXIB 100 MILLIGRAM(S): 200 CAPSULE ORAL at 09:18

## 2017-11-09 RX ADMIN — TRAMADOL HYDROCHLORIDE 50 MILLIGRAM(S): 50 TABLET ORAL at 21:45

## 2017-11-09 RX ADMIN — Medication 81 MILLIGRAM(S): at 12:12

## 2017-11-09 RX ADMIN — GABAPENTIN 100 MILLIGRAM(S): 400 CAPSULE ORAL at 12:12

## 2017-11-09 RX ADMIN — TRAMADOL HYDROCHLORIDE 50 MILLIGRAM(S): 50 TABLET ORAL at 13:12

## 2017-11-09 RX ADMIN — TRAMADOL HYDROCHLORIDE 50 MILLIGRAM(S): 50 TABLET ORAL at 12:12

## 2017-11-09 RX ADMIN — Medication 325 MILLIGRAM(S): at 12:12

## 2017-11-09 RX ADMIN — Medication 1 TABLET(S): at 12:12

## 2017-11-09 RX ADMIN — Medication 1 MILLIGRAM(S): at 12:12

## 2017-11-09 RX ADMIN — Medication 500 MILLIGRAM(S): at 17:57

## 2017-11-09 RX ADMIN — CELECOXIB 100 MILLIGRAM(S): 200 CAPSULE ORAL at 08:48

## 2017-11-09 RX ADMIN — Medication 15 MILLIGRAM(S): at 09:00

## 2017-11-09 RX ADMIN — Medication 40 MILLIGRAM(S): at 06:46

## 2017-11-09 RX ADMIN — TRAMADOL HYDROCHLORIDE 50 MILLIGRAM(S): 50 TABLET ORAL at 22:47

## 2017-11-09 RX ADMIN — OXYCODONE HYDROCHLORIDE 5 MILLIGRAM(S): 5 TABLET ORAL at 13:35

## 2017-11-09 RX ADMIN — Medication 15 MILLIGRAM(S): at 08:49

## 2017-11-09 RX ADMIN — Medication 325 MILLIGRAM(S): at 08:49

## 2017-11-09 NOTE — PROGRESS NOTE ADULT - PROBLEM SELECTOR PLAN 1
72yFemale S/p L Total Knee Replacement POD# 1  Daily PT-WBAT to LLE  D/c YINKA zelaya  D/c mateo
- dc pca  - toradol 15mg ivp q 6 hours prn  - celebrex 200mg po daily  - oob  - PT  - stool softeners  - tramadol 50mg po q 8   - oxy ir 5mg po q 4 hours prn  - gabapentin 100mg po daily
- continue pca hydromorphone  - pca teaching done  - toradol 15mg ivp q 6 hours prn  - celebrex 200mg po daily  - oob  - PT  - stool softeners

## 2017-11-09 NOTE — PROGRESS NOTE ADULT - SUBJECTIVE AND OBJECTIVE BOX
Chief Complaint: left knee pain    HPI:   72y Female s/p left knee replacement, pod#2.  Pt complaining of mild left knee pain which worsens on exertion.  No nausea or vomiting. No chest pain or sob.  + left knee pain radiates down leg.        PAIN SCORE:    5/10     SCALE USED: (1-10 VNRS)    Allergies    penicillins (Rash)    Intolerances      MEDICATIONS  (STANDING):  ascorbic acid 500 milliGRAM(s) Oral two times a day  aspirin  chewable 81 milliGRAM(s) Oral daily  atorvastatin 10 milliGRAM(s) Oral at bedtime  celecoxib 100 milliGRAM(s) Oral after breakfast  docusate sodium 100 milliGRAM(s) Oral three times a day  enoxaparin Injectable 30 milliGRAM(s) SubCutaneous every 12 hours  ferrous    sulfate 325 milliGRAM(s) Oral three times a day with meals  folic acid 1 milliGRAM(s) Oral daily  gabapentin 100 milliGRAM(s) Oral daily  hydrochlorothiazide 25 milliGRAM(s) Oral daily  multivitamin 1 Tablet(s) Oral daily  propranolol 40 milliGRAM(s) Oral daily  sodium chloride 0.45%. 1000 milliLiter(s) (80 mL/Hr) IV Continuous <Continuous>  traMADol 50 milliGRAM(s) Oral every 8 hours    MEDICATIONS  (PRN):  acetaminophen   Tablet 650 milliGRAM(s) Oral every 6 hours PRN For Temp over 38.3 C (100.94 F)  aluminum hydroxide/magnesium hydroxide/simethicone Suspension 30 milliLiter(s) Oral four times a day PRN Indigestion  bisacodyl Suppository 10 milliGRAM(s) Rectal daily PRN If no bowel movement by postoperative day #2  ketorolac   Injectable 15 milliGRAM(s) IV Push every 6 hours PRN Moderate Pain (4 - 6)  oxyCODONE    IR 5 milliGRAM(s) Oral every 6 hours PRN Severe Pain (7 - 10)  senna 2 Tablet(s) Oral at bedtime PRN Constipation      PHYSICAL EXAM:    Vital Signs Last 24 Hrs  T(C): 37.2 (09 Nov 2017 05:45), Max: 37.2 (09 Nov 2017 05:45)  T(F): 98.9 (09 Nov 2017 05:45), Max: 98.9 (09 Nov 2017 05:45)  HR: 73 (09 Nov 2017 12:12) (73 - 84)  BP: 112/64 (09 Nov 2017 12:12) (112/64 - 153/77)  BP(mean): --  RR: 18 (09 Nov 2017 05:45) (15 - 18)  SpO2: 98% (09 Nov 2017 12:12) (96% - 98%)             LABS:                          9.6    7.3   )-----------( 175      ( 09 Nov 2017 10:28 )             28.2     11-09    138  |  103  |  12  ----------------------------<  110<H>  3.8   |  29  |  0.67    Ca    8.4      09 Nov 2017 10:28            Drug Screen:        RADIOLOGY:

## 2017-11-09 NOTE — PROGRESS NOTE ADULT - SUBJECTIVE AND OBJECTIVE BOX
72yFemale    Diagnosis:  S/p LEFT Total Knee Replacement POD#2    Patient was seen and evaluated at bedside. Patient with no acute complaints.   Pain is  well controlled.  Awaiting PT for ambulation.     Denies CP/SOB, dyspnea, paresthesias, N/V/D, palpitations.     Vital Signs Last 24 Hrs  T(C): 37.2 (09 Nov 2017 05:45), Max: 37.2 (09 Nov 2017 05:45)  T(F): 98.9 (09 Nov 2017 05:45), Max: 98.9 (09 Nov 2017 05:45)  HR: 80 (09 Nov 2017 05:45) (61 - 84)  BP: 153/77 (09 Nov 2017 05:45) (112/58 - 153/77)  BP(mean): --  RR: 18 (09 Nov 2017 05:45) (15 - 18)  SpO2: 96% (09 Nov 2017 05:45) (95% - 100%)  I&O's Detail      Physical Exam:    General: AAOx3, NAD, resting comfortably in bed.    Left knee:  Dressing removed-> Wound is clean, dry, with staples intact. No erythema. Skin is pink and warm. SILT.  No drainage.   Lower extremities:  No calf tenderness, calves are soft. 2+pulses. NVI. 5/5 Strength of EHL/TA/gastrocnemius B/L.  Good capillary refill. SILT.    labs: pending    Impression:  72yFemale S/p Left Total Knee Replacement POD#2  Plan:  -  Pain management  -  Dvt prophylaxis with Lovenox  -  Daily Physical Therapy:  WBAT of the left lower extremity with walker  -  Discharge planning: Home Vs. Rehab pending Physical therapy eval.  -  Case d/w Dr. Carrlilo  -  Dressing changed today, new dressing applied.  -  Incentive spirometry encouraged. 72yFemale    Diagnosis:  S/p LEFT Total Knee Replacement POD#2    Patient was seen and evaluated at bedside. Patient with no acute complaints.   Pain is  well controlled.  Awaiting PT for ambulation.     Denies CP/SOB, dyspnea, paresthesias, N/V/D, palpitations.     Vital Signs Last 24 Hrs  T(C): 37.2 (09 Nov 2017 05:45), Max: 37.2 (09 Nov 2017 05:45)  T(F): 98.9 (09 Nov 2017 05:45), Max: 98.9 (09 Nov 2017 05:45)  HR: 80 (09 Nov 2017 05:45) (61 - 84)  BP: 153/77 (09 Nov 2017 05:45) (112/58 - 153/77)  BP(mean): --  RR: 18 (09 Nov 2017 05:45) (15 - 18)  SpO2: 96% (09 Nov 2017 05:45) (95% - 100%)  I&O's Detail      Physical Exam:    General: AAOx3, NAD, resting comfortably in bed.    Left knee:  Dressing removed-> Wound is clean, dry, with staples intact. No erythema. Skin is pink and warm. SILT.  No drainage.   Lower extremities:  No calf tenderness, calves are soft. 2+pulses. NVI. 5/5 Strength of EHL/TA/gastrocnemius B/L.  Good capillary refill. SILT.    labs:                         9.6    7.3   )-----------( 175      ( 09 Nov 2017 10:28 )             28.2   11-09    138  |  103  |  12  ----------------------------<  110<H>  3.8   |  29  |  0.67    Ca    8.4      09 Nov 2017 10:28        Impression:  72yFemale S/p Left Total Knee Replacement POD#2  Plan:  -  Pain management  -  Dvt prophylaxis with Lovenox  -  Daily Physical Therapy:  WBAT of the left lower extremity with walker  -  Discharge planning: Home Vs. Rehab pending Physical therapy eval.  -  Case d/w Dr. Carrillo  -  Dressing changed today, new dressing applied.  -  Incentive spirometry encouraged.

## 2017-11-09 NOTE — PROGRESS NOTE ADULT - NEUROLOGICAL DETAILS
responds to verbal commands/alert and oriented x 3/responds to pain
alert and oriented x 3/responds to verbal commands/responds to pain

## 2017-11-09 NOTE — PROGRESS NOTE ADULT - PROBLEM SELECTOR PROBLEM 2
Hyperlipidemia, unspecified hyperlipidemia type
Status post total knee replacement, left
Status post total knee replacement, left

## 2017-11-10 ENCOUNTER — TRANSCRIPTION ENCOUNTER (OUTPATIENT)
Age: 72
End: 2017-11-10

## 2017-11-10 VITALS
RESPIRATION RATE: 16 BRPM | OXYGEN SATURATION: 97 % | SYSTOLIC BLOOD PRESSURE: 129 MMHG | DIASTOLIC BLOOD PRESSURE: 63 MMHG | TEMPERATURE: 98 F | HEART RATE: 72 BPM

## 2017-11-10 PROCEDURE — 86900 BLOOD TYPING SEROLOGIC ABO: CPT

## 2017-11-10 PROCEDURE — 85027 COMPLETE CBC AUTOMATED: CPT

## 2017-11-10 PROCEDURE — C1776: CPT

## 2017-11-10 PROCEDURE — 73562 X-RAY EXAM OF KNEE 3: CPT

## 2017-11-10 PROCEDURE — 97110 THERAPEUTIC EXERCISES: CPT

## 2017-11-10 PROCEDURE — 97530 THERAPEUTIC ACTIVITIES: CPT

## 2017-11-10 PROCEDURE — 88311 DECALCIFY TISSUE: CPT

## 2017-11-10 PROCEDURE — 88305 TISSUE EXAM BY PATHOLOGIST: CPT

## 2017-11-10 PROCEDURE — 86870 RBC ANTIBODY IDENTIFICATION: CPT

## 2017-11-10 PROCEDURE — 86850 RBC ANTIBODY SCREEN: CPT

## 2017-11-10 PROCEDURE — 80048 BASIC METABOLIC PNL TOTAL CA: CPT

## 2017-11-10 PROCEDURE — 86922 COMPATIBILITY TEST ANTIGLOB: CPT

## 2017-11-10 PROCEDURE — 97161 PT EVAL LOW COMPLEX 20 MIN: CPT

## 2017-11-10 PROCEDURE — C1713: CPT

## 2017-11-10 PROCEDURE — 97116 GAIT TRAINING THERAPY: CPT

## 2017-11-10 PROCEDURE — 86901 BLOOD TYPING SEROLOGIC RH(D): CPT

## 2017-11-10 RX ORDER — ENOXAPARIN SODIUM 100 MG/ML
30 INJECTION SUBCUTANEOUS
Qty: 0 | Refills: 0 | COMMUNITY
Start: 2017-11-10

## 2017-11-10 RX ORDER — FOLIC ACID 0.8 MG
1 TABLET ORAL
Qty: 0 | Refills: 0 | COMMUNITY
Start: 2017-11-10

## 2017-11-10 RX ORDER — DOCUSATE SODIUM 100 MG
1 CAPSULE ORAL
Qty: 0 | Refills: 0 | COMMUNITY
Start: 2017-11-10

## 2017-11-10 RX ORDER — FERROUS SULFATE 325(65) MG
1 TABLET ORAL
Qty: 0 | Refills: 0 | COMMUNITY

## 2017-11-10 RX ORDER — TRAMADOL HYDROCHLORIDE 50 MG/1
1 TABLET ORAL
Qty: 0 | Refills: 0 | COMMUNITY
Start: 2017-11-10

## 2017-11-10 RX ORDER — ASCORBIC ACID 60 MG
1 TABLET,CHEWABLE ORAL
Qty: 0 | Refills: 0 | COMMUNITY
Start: 2017-11-10

## 2017-11-10 RX ADMIN — GABAPENTIN 100 MILLIGRAM(S): 400 CAPSULE ORAL at 11:54

## 2017-11-10 RX ADMIN — TRAMADOL HYDROCHLORIDE 50 MILLIGRAM(S): 50 TABLET ORAL at 11:54

## 2017-11-10 RX ADMIN — Medication 325 MILLIGRAM(S): at 08:42

## 2017-11-10 RX ADMIN — Medication 25 MILLIGRAM(S): at 05:18

## 2017-11-10 RX ADMIN — ENOXAPARIN SODIUM 30 MILLIGRAM(S): 100 INJECTION SUBCUTANEOUS at 11:54

## 2017-11-10 RX ADMIN — CELECOXIB 100 MILLIGRAM(S): 200 CAPSULE ORAL at 09:42

## 2017-11-10 RX ADMIN — Medication 325 MILLIGRAM(S): at 11:54

## 2017-11-10 RX ADMIN — Medication 100 MILLIGRAM(S): at 05:18

## 2017-11-10 RX ADMIN — TRAMADOL HYDROCHLORIDE 50 MILLIGRAM(S): 50 TABLET ORAL at 12:54

## 2017-11-10 RX ADMIN — OXYCODONE HYDROCHLORIDE 5 MILLIGRAM(S): 5 TABLET ORAL at 08:42

## 2017-11-10 RX ADMIN — ENOXAPARIN SODIUM 30 MILLIGRAM(S): 100 INJECTION SUBCUTANEOUS at 00:21

## 2017-11-10 RX ADMIN — CELECOXIB 100 MILLIGRAM(S): 200 CAPSULE ORAL at 08:42

## 2017-11-10 RX ADMIN — TRAMADOL HYDROCHLORIDE 50 MILLIGRAM(S): 50 TABLET ORAL at 05:18

## 2017-11-10 RX ADMIN — Medication 81 MILLIGRAM(S): at 11:54

## 2017-11-10 RX ADMIN — Medication 40 MILLIGRAM(S): at 05:18

## 2017-11-10 RX ADMIN — OXYCODONE HYDROCHLORIDE 5 MILLIGRAM(S): 5 TABLET ORAL at 09:42

## 2017-11-10 RX ADMIN — Medication 1 TABLET(S): at 11:54

## 2017-11-10 RX ADMIN — Medication 1 MILLIGRAM(S): at 11:54

## 2017-11-10 RX ADMIN — TRAMADOL HYDROCHLORIDE 50 MILLIGRAM(S): 50 TABLET ORAL at 06:45

## 2017-11-10 RX ADMIN — Medication 100 MILLIGRAM(S): at 11:54

## 2017-11-10 RX ADMIN — Medication 500 MILLIGRAM(S): at 05:18

## 2017-11-10 NOTE — DISCHARGE NOTE ADULT - CARE PROVIDER_API CALL
Adriano Carrillo (MD), Orthopaedic Surgery; Sports Medicine  [address]  [place] [address]  Phone: [phone]  Fax: [phone]

## 2017-11-10 NOTE — DISCHARGE NOTE ADULT - MEDICATION SUMMARY - MEDICATIONS TO TAKE
I will START or STAY ON the medications listed below when I get home from the hospital:    traMADol 50 mg oral tablet  -- 1 tab(s) by mouth every 8 hours  -- Indication: For Pain    aspirin 81 mg oral tablet  -- 1 tab(s) by mouth once a day  -- Indication: For As before    propranolol 40 mg oral tablet  -- 1 tab(s) by mouth once a day  -- Indication: For As before    enoxaparin  -- 30 milligram(s) subcutaneous every 12 hours  -- Indication: For dvt prophylaxis    lovastatin 20 mg oral tablet  -- 1 tab(s) by mouth once a day  -- Indication: For As before    hydroCHLOROthiazide 25 mg oral tablet  -- 1 tab(s) by mouth once a day  -- Indication: For As before    ferrous sulfate 325 mg (65 mg elemental iron) oral tablet  -- 1 tab(s) by mouth 3 times a day  -- Indication: For Anemia    docusate sodium 100 mg oral capsule  -- 1 cap(s) by mouth 3 times a day  -- Indication: For constipation    ascorbic acid 500 mg oral tablet  -- 1 tab(s) by mouth 2 times a day  -- Indication: For Supplement    folic acid 1 mg oral tablet  -- 1 tab(s) by mouth once a day  -- Indication: For Supplement

## 2017-11-10 NOTE — DISCHARGE NOTE ADULT - CONDITIONS AT DISCHARGE
Pt AOx3 breathing unlabored no c/o resp. distress chest pain or SOB. Pt S/P left TKR left knee noted with Mepilex dressing CDI Cap refill less than 3 seconds pulses present in all extremities Pt with positive sensation and mobility no neurovascular deficit OOB ambulating with walker voiding without any difficulties. Abdomen soft non tender non distended BS present in all 4 quadrants Pt tolerating diet denies any N/V. Vital signs stable no distress noted. Pt for DC to rehab today verbalizes understanding and agreement with DC. All needs of pt met.

## 2017-11-10 NOTE — DISCHARGE NOTE ADULT - CARE PLAN
Principal Discharge DX:	Primary osteoarthritis of left knee  Goal:	Left total knee replacement  Instructions for follow-up, activity and diet:	Pain Management- See Attached Medication Reconciliation    **StretchStrap Stretching exercises for Total knee replacement***  Weight Bearing Status:  WBAT of LLE  Equipment needs: Commode, Walker  Incision Site: REMOVE STAPLES on 11/22/17  REMOVE DRESSING PRIOR TO STAPLE REMOVAL  STAPLES TO BE REMOVED BY NURSE  NURSE TO APPLY STERI-STRIPS TO THE WOUND AFTER STAPLE REMOVAL   Dvt prophylaxis: D/C LOVENOX on 11/22/17  PT/Occupational Therapy are Activities of Daily Living as appropriate  Follow up with Orthopedic Surgeon after STAPLES ARE REMOVED at: [phone]  Secondary Diagnosis:	Essential (primary) hypertension

## 2017-11-10 NOTE — PROGRESS NOTE ADULT - SUBJECTIVE AND OBJECTIVE BOX
72yFemale    Diagnosis:  S/p Left Total Knee Replacement POD#3    Patient was seen and evaluated at bedside. Patient with no acute complaints.   Pain is well controlled.  Denies CP/SOB, dyspnea, paresthesias, N/V/D, palpitations.     Vital Signs Last 24 Hrs  T(C): 36.9 (10 Nov 2017 05:56), Max: 36.9 (10 Nov 2017 05:56)  T(F): 98.4 (10 Nov 2017 05:56), Max: 98.4 (10 Nov 2017 05:56)  HR: 80 (10 Nov 2017 05:56) (73 - 86)  BP: 131/64 (10 Nov 2017 05:56) (112/64 - 147/78)  BP(mean): --  RR: 18 (10 Nov 2017 05:56) (16 - 18)  SpO2: 94% (10 Nov 2017 05:56) (94% - 98%)  I&O's Detail      Physical Exam:    General: AAOx3, NAD, resting comfortably in bed.    Left KNEE:  Dressing is C/D/I. Skin is pink and warm. Staples intact. No erythema. SILT.  Wound with no drainage, healing well.   Lower extremity:  No calf tenderness, calves are soft. 2+pulses. NVI. 5/5 Strength of EHL/TA/gastrocnemius B/L.  Good capillary refill.                           9.6    7.3   )-----------( 175      ( 09 Nov 2017 10:28 )             28.2     11-09    138  |  103  |  12  ----------------------------<  110<H>  3.8   |  29  |  0.67    Ca    8.4      09 Nov 2017 10:28        Impression:  72yFemale S/p Left Total Knee Replacement POD#3  Plan:  -  Pain management  -  Dvt prophylaxis with Lovenox  -  Daily Physical Theapy:  WBAT  -  Continue with heel bump when laying in bed  -  Discharge planning: Rehab

## 2017-11-10 NOTE — DISCHARGE NOTE ADULT - PATIENT PORTAL LINK FT
“You can access the Follow[employer] Patient Portal, offered by [facility], by registering with the following website: [email or web address]”

## 2017-11-10 NOTE — DISCHARGE NOTE ADULT - HAS THE PATIENT RECEIVED THE INFLUENZA VACCINE DURING THIS VISIT
[facility] Emergency Department    [address]     [place] [address]    Phone:  [phone]    Fax:  [phone]           Cayla Robby   MRN: [id number]    Department:  [facility] Emergency Department   Date of Visit:  1/8/2017 service to you, we would appreciate any positive or negative feedback related to the care you received in our emergency department. Please call our [address] at [phone]. Your Emergency Department team is here to serve you.   You are our top priori I certified that I have received a copy of the aftercare instructions; that these instructions have been explained to me; all questions pertaining to these instructions have been answered in a satisfactory manner.         [address] your Zip Code and Date of Birth to complete the sign-up process. If you do not sign up before the expiration date, you must request a new code.     Your unique TAPP Access Code: [email or web address]  Expires: 3/9/2017  4:17 AM    If you have questions, you can ca No

## 2017-11-10 NOTE — DISCHARGE NOTE ADULT - PLAN OF CARE
Left total knee replacement Pain Management- See Attached Medication Reconciliation    **StretchStrap Stretching exercises for Total knee replacement***  Weight Bearing Status:  WBAT of LLE  Equipment needs: Commode, Walker  Incision Site: REMOVE STAPLES on 11/22/17  REMOVE DRESSING PRIOR TO STAPLE REMOVAL  STAPLES TO BE REMOVED BY NURSE  NURSE TO APPLY STERI-STRIPS TO THE WOUND AFTER STAPLE REMOVAL   Dvt prophylaxis: D/C LOVENOX on 11/22/17  PT/Occupational Therapy are Activities of Daily Living as appropriate  Follow up with Orthopedic Surgeon after STAPLES ARE REMOVED at: [phone]

## 2020-12-01 NOTE — H&P PST ADULT - CONSTITUTIONAL DETAILS
Received call from monitor tech that patient had a 12 beat run of Vatch. Patient stable, denies chest pain.  DEAN Del Angel NP notified. EKG and labs ordered. Will follow up and monitor patient.    well-developed/no distress/well-groomed

## 2022-02-08 NOTE — H&P PST ADULT - NEGATIVE RESPIRATORY AND THORAX SYMPTOMS
no pleuritic chest pain/no wheezing/no dyspnea/no cough/no hemoptysis Griseofulvin Pregnancy And Lactation Text: This medication is Pregnancy Category X and is known to cause serious birth defects. It is unknown if this medication is excreted in breast milk but breast feeding should be avoided.

## 2023-07-26 PROBLEM — E78.5 HYPERLIPIDEMIA, UNSPECIFIED: Chronic | Status: ACTIVE | Noted: 2017-10-25

## 2023-07-26 PROBLEM — M17.12 UNILATERAL PRIMARY OSTEOARTHRITIS, LEFT KNEE: Chronic | Status: ACTIVE | Noted: 2017-10-25

## 2023-07-26 PROBLEM — I10 ESSENTIAL (PRIMARY) HYPERTENSION: Chronic | Status: ACTIVE | Noted: 2017-10-25

## 2023-08-02 ENCOUNTER — APPOINTMENT (OUTPATIENT)
Dept: ORTHOPEDIC SURGERY | Facility: CLINIC | Age: 78
End: 2023-08-02
Payer: MEDICARE

## 2023-08-02 VITALS — HEIGHT: 68 IN | WEIGHT: 158 LBS | BODY MASS INDEX: 23.95 KG/M2

## 2023-08-02 DIAGNOSIS — M17.12 UNILATERAL PRIMARY OSTEOARTHRITIS, LEFT KNEE: ICD-10-CM

## 2023-08-02 DIAGNOSIS — M25.561 PAIN IN RIGHT KNEE: ICD-10-CM

## 2023-08-02 PROBLEM — Z00.00 ENCOUNTER FOR PREVENTIVE HEALTH EXAMINATION: Status: ACTIVE | Noted: 2023-08-02

## 2023-08-02 PROCEDURE — 73564 X-RAY EXAM KNEE 4 OR MORE: CPT | Mod: RT

## 2023-08-02 PROCEDURE — 99203 OFFICE O/P NEW LOW 30 MIN: CPT

## 2023-08-02 NOTE — ADDENDUM
[FreeTextEntry1] : I, Kitty Linn, acted solely as a scribe for Dr. Michel Madrid MD on this date 08/02/2023 .  All medical record entries made by the Scribe were at my, Dr. Michel Madrid MD , direction and personally dictated by me on 08/02/2023 . I have reviewed the chart and agree that the record accurately reflects my personal performance of the history, physical exam, assessment and plan. I have also personally directed, reviewed, and agreed with the chart.

## 2023-08-02 NOTE — PHYSICAL EXAM
[de-identified] : Constitutional: Well nourished , well developed and in no acute distress Psychiatric: Alert and oriented to time place and person.Appropriate affect . Skin: Head, neck, arms and lower extremities:no lesions or discoloration HEENT: Normocephalic, EOM intact, Nasal septum midline, Respiratory: Unlabored respirations,no audible wheezing ,no tachypnea, no cyanosis Cardiovascular: No leg swelling no ankle edema no JVD, pulse regular Vascular: No calf or thigh tenderness, Peripheral pulses: intact Lymphatics: No groin adenopathy,no lymphedema lower or upper extremities  Left Knee Exam: Inspection/Palpation : no tenderness to palpation, no swelling, no deformity Range of Motion : 0 - 120 degrees, no crepitus Stability : no valgus or varus instability present on provocative testing, Lachmans Test (-) Motor Strength: Peroneals, EHL, and tibialis anterior 5/5 Reflexes: Patella 2+ R=L, Achilles 2+ R=L Sensation : sensation to pin intact Vascular Exam : no edema, no cyanosis, dorsalis pedis artery pulse 2+, posterior tibial artery pulse 2+ Skin : no erythema, no ecchymosis  Right Knee Exam: Inspection/Palpation : no tenderness to palpation, no swelling, no deformity Range of Motion : 0 - 120 degrees, no crepitus Stability : no valgus or varus instability present on provocative testing, Lachmans Test (-) Sensation : sensation to pin intact Vascular Exam : no edema, no cyanosis, dorsalis pedis artery pulse 2+, posterior tibial artery pulse 2+ Skin : no erythema, no ecchymosis [de-identified] : 4 views of the right knee obtained today 08/02/2023 demonstrates lateral compartment degenerative narrowing bone on bone opposition, marginal osteophyte formation, varus alignment.   2 views of the right knee obtained 07/09/2023 demonstrates osteoarthritis.

## 2023-08-02 NOTE — HISTORY OF PRESENT ILLNESS
[de-identified] : CINTHIA PITTS is a 78 year old female who presents for initial evaluation of right knee pain.  Spontaneous onset of pain 6 months ago, without any inciting injury. She localizes the pain to the lateral and posterior aspects of her right knee. She also reports stiffness in right knee. Pain is provoked by walking or stairs. Denies any trauma or injury. Denies any swelling, locking, or giving out. She has attended PT with no improvement in symptoms. Denies any injections. Patient presents ambulating independently.  Occasional Aleve with mild to moderate relief of symptoms.  History of Left TKR in 2018 in [place]. She is on blood pressure medication.

## 2023-08-02 NOTE — DISCUSSION/SUMMARY
[de-identified] : 78 year female old with end-stage osteoarthritis right knee. We discussed at length the nature of the patient's condition. We discussed all options and conservative measures, such as ice, NSAIDs, physical therapy, exercise limitations, and injections.  I recommend a referral to Dr. Hunt for cortisone injections of the right knee.  Follow up in 3 months.  The patient understood and verbally agreed to the treatment plan. All of their questions were answered. The patient should call the office if they have any questions or experience worsening symptoms.

## 2023-08-11 ENCOUNTER — APPOINTMENT (OUTPATIENT)
Dept: ORTHOPEDIC SURGERY | Facility: CLINIC | Age: 78
End: 2023-08-11
Payer: MEDICARE

## 2023-08-11 VITALS — BODY MASS INDEX: 24.91 KG/M2 | WEIGHT: 155 LBS | HEIGHT: 66 IN

## 2023-08-11 DIAGNOSIS — M17.11 UNILATERAL PRIMARY OSTEOARTHRITIS, RIGHT KNEE: ICD-10-CM

## 2023-08-11 PROCEDURE — 99204 OFFICE O/P NEW MOD 45 MIN: CPT

## 2023-08-11 RX ORDER — HYALURONATE SODIUM, STABILIZED 88 MG/4 ML
88 SYRINGE (ML) INTRAARTICULAR
Qty: 1 | Refills: 0 | Status: ACTIVE | COMMUNITY
Start: 2023-08-11 | End: 1900-01-01

## 2023-08-14 PROBLEM — M17.11 PRIMARY OSTEOARTHRITIS OF RIGHT KNEE: Status: ACTIVE | Noted: 2023-08-02

## 2023-08-16 ENCOUNTER — APPOINTMENT (OUTPATIENT)
Dept: ORTHOPEDIC SURGERY | Facility: CLINIC | Age: 78
End: 2023-08-16
Payer: MEDICARE

## 2023-08-16 DIAGNOSIS — M17.11 UNILATERAL PRIMARY OSTEOARTHRITIS, RIGHT KNEE: ICD-10-CM

## 2023-08-16 PROCEDURE — 20611 DRAIN/INJ JOINT/BURSA W/US: CPT | Mod: RT

## 2023-08-16 PROCEDURE — 99214 OFFICE O/P EST MOD 30 MIN: CPT | Mod: 25

## 2023-08-17 PROBLEM — M17.11 ARTHRITIS OF RIGHT KNEE: Status: ACTIVE | Noted: 2023-08-02

## 2024-04-24 NOTE — PATIENT PROFILE ADULT. - INFLUENZA IMMUNIZATION DATE:
Dosage 19 (Mg/Week): 0 Document Previous Cumulative Dosage (Historical Patients Only): No - New Methotrexate Patient Comments: Pt is on MTX since 2021 (given by rheum) Add Additional Dosage: No Add High Risk Medication Management Associated Diagnosis?: Yes Current Dosage: 17.5mg/week Detail Level: Generalized OCTOBER/2017
